# Patient Record
Sex: FEMALE | URBAN - METROPOLITAN AREA
[De-identification: names, ages, dates, MRNs, and addresses within clinical notes are randomized per-mention and may not be internally consistent; named-entity substitution may affect disease eponyms.]

---

## 2024-04-30 ENCOUNTER — LAB (OUTPATIENT)
Dept: URBAN - METROPOLITAN AREA CLINIC 82 | Facility: CLINIC | Age: 39
End: 2024-04-30

## 2024-04-30 ENCOUNTER — OV NP (OUTPATIENT)
Dept: URBAN - METROPOLITAN AREA CLINIC 82 | Facility: CLINIC | Age: 39
End: 2024-04-30

## 2024-04-30 VITALS
HEIGHT: 60 IN | WEIGHT: 137 LBS | HEART RATE: 98 BPM | SYSTOLIC BLOOD PRESSURE: 118 MMHG | DIASTOLIC BLOOD PRESSURE: 82 MMHG | TEMPERATURE: 97.9 F | BODY MASS INDEX: 26.9 KG/M2

## 2024-04-30 PROBLEM — 61977001: Status: ACTIVE | Noted: 2024-04-30

## 2024-04-30 NOTE — HPI-TODAY'S VISIT:
38-year-old female patient came into the office for evaluation of hep B and clearance from GI prior to starting on IVF.  Patient stated she was diagnosed with a hep B and carrier state since she was a teenager.  During adult years she had been followed closely by her PCP never had been started on treatment.  Patient denies any family history of hepatocellular cancer.  She is planning on getting IVF and her gynecologist wanted to have a clearance from the GI.  Denies any abdominal pain nausea vomiting no recent ultrasound done.  Most recent PCR quant showed 1050 viral load.  LFTs are not available to review.  Denies any abdominal pain nausea vomiting denies any liver manifestations of hep B.

## 2024-05-02 ENCOUNTER — OFFICE VISIT (OUTPATIENT)
Dept: URBAN - METROPOLITAN AREA CLINIC 114 | Facility: CLINIC | Age: 39
End: 2024-05-02
Payer: COMMERCIAL

## 2024-05-02 DIAGNOSIS — B19.10 HEPATITIS B: ICD-10-CM

## 2024-05-02 PROCEDURE — 76705 ECHO EXAM OF ABDOMEN: CPT | Performed by: INTERNAL MEDICINE

## 2024-05-03 ENCOUNTER — WEB ENCOUNTER (OUTPATIENT)
Dept: URBAN - METROPOLITAN AREA CLINIC 82 | Facility: CLINIC | Age: 39
End: 2024-05-03

## 2024-05-08 ENCOUNTER — WEB ENCOUNTER (OUTPATIENT)
Dept: URBAN - METROPOLITAN AREA CLINIC 82 | Facility: CLINIC | Age: 39
End: 2024-05-08

## 2024-05-09 ENCOUNTER — WEB ENCOUNTER (OUTPATIENT)
Dept: URBAN - METROPOLITAN AREA CLINIC 82 | Facility: CLINIC | Age: 39
End: 2024-05-09

## 2024-05-10 ENCOUNTER — WEB ENCOUNTER (OUTPATIENT)
Dept: URBAN - METROPOLITAN AREA CLINIC 82 | Facility: CLINIC | Age: 39
End: 2024-05-10

## 2024-05-10 ENCOUNTER — WEB ENCOUNTER (OUTPATIENT)
Dept: URBAN - METROPOLITAN AREA CLINIC 115 | Facility: CLINIC | Age: 39
End: 2024-05-10

## 2025-01-17 ENCOUNTER — OFFICE VISIT (OUTPATIENT)
Dept: URBAN - METROPOLITAN AREA CLINIC 82 | Facility: CLINIC | Age: 40
End: 2025-01-17

## 2025-01-22 ENCOUNTER — WEB ENCOUNTER (OUTPATIENT)
Dept: URBAN - METROPOLITAN AREA CLINIC 115 | Facility: CLINIC | Age: 40
End: 2025-01-22

## 2025-01-22 ENCOUNTER — TELEPHONE ENCOUNTER (OUTPATIENT)
Dept: URBAN - METROPOLITAN AREA CLINIC 6 | Facility: CLINIC | Age: 40
End: 2025-01-22

## 2025-01-22 ENCOUNTER — DASHBOARD ENCOUNTERS (OUTPATIENT)
Age: 40
End: 2025-01-22

## 2025-01-22 ENCOUNTER — OFFICE VISIT (OUTPATIENT)
Dept: URBAN - METROPOLITAN AREA TELEHEALTH 2 | Facility: TELEHEALTH | Age: 40
End: 2025-01-22
Payer: COMMERCIAL

## 2025-01-22 VITALS — BODY MASS INDEX: 29.06 KG/M2 | WEIGHT: 148 LBS | HEIGHT: 60 IN

## 2025-01-22 DIAGNOSIS — Z3A.20 20 WEEKS GESTATION OF PREGNANCY: ICD-10-CM

## 2025-01-22 DIAGNOSIS — B18.1 CHRONIC HEPATITIS B: ICD-10-CM

## 2025-01-22 PROBLEM — 23464008: Status: ACTIVE | Noted: 2025-01-22

## 2025-01-22 PROCEDURE — 99214 OFFICE O/P EST MOD 30 MIN: CPT | Performed by: INTERNAL MEDICINE

## 2025-01-22 NOTE — HPI-TODAY'S VISIT:
38-year-old female patient came into the office for evaluation of hep B and clearance from GI prior to starting on IVF.  Patient stated she was diagnosed with a hep B and carrier state since she was a teenager.  During adult years she had been followed closely by her PCP never had been started on treatment.  Patient denies any family history of hepatocellular cancer.  She is planning on getting IVF and her gynecologist wanted to have a clearance from the GI.  Denies any abdominal pain nausea vomiting no recent ultrasound done.  Most recent PCR quant showed 1050 viral load.  LFTs are not available to review.  Denies any abdominal pain nausea vomiting denies any liver manifestations of hep B.  1/22/25 : Patient was seen today for follow-up regarding her chronic hep B carrier states she was diagnosed with pregnancy and she was going through in vitro fertilization.  Patient is currently 20 weeks pregnant her OB have asked her to see GI since she is into second trimester and third trimester.  Patient denies any abdominal pain nausea vomiting.  Prior records were reviewed denies any family history of liver cancer.  She has chronic hep B carrier state and this is her first pregnancy.

## 2025-01-26 LAB
A/G RATIO: 1.2
ALBUMIN: 3.4
ALKALINE PHOSPHATASE: 58
ALT (SGPT): 20
AST (SGOT): 17
BILIRUBIN, TOTAL: 0.2
BUN/CREATININE RATIO: 13
BUN: 6
CALCIUM: 8.5
CARBON DIOXIDE, TOTAL: 25
CHLORIDE: 105
CREATININE: 0.45
EGFR: 125
GLOBULIN, TOTAL: 2.9
GLUCOSE: 97
HEP BE AB: REACTIVE
HEP BE AG: (no result)
HEPATITIS B SURFACE ANTIBODY QL: (no result)
HEPATITIS B VIRUS DNA: NOT DETECTED
HEPATITIS B VIRUS DNA: NOT DETECTED
POTASSIUM: 4.3
PROTEIN, TOTAL: 6.3
SODIUM: 137

## 2025-01-31 ENCOUNTER — TELEPHONE ENCOUNTER (OUTPATIENT)
Dept: URBAN - METROPOLITAN AREA CLINIC 85 | Facility: CLINIC | Age: 40
End: 2025-01-31

## 2025-03-26 ENCOUNTER — OFFICE VISIT (OUTPATIENT)
Dept: URBAN - METROPOLITAN AREA CLINIC 115 | Facility: CLINIC | Age: 40
End: 2025-03-26
Payer: COMMERCIAL

## 2025-03-26 DIAGNOSIS — B18.1 CHRONIC HEPATITIS B: ICD-10-CM

## 2025-03-26 DIAGNOSIS — Z3A.34 34 WEEKS GESTATION OF PREGNANCY: ICD-10-CM

## 2025-03-26 PROBLEM — 13763000: Status: ACTIVE | Noted: 2025-03-26

## 2025-03-26 PROBLEM — 90797000: Status: ACTIVE | Noted: 2025-03-26

## 2025-03-26 PROCEDURE — 99214 OFFICE O/P EST MOD 30 MIN: CPT | Performed by: INTERNAL MEDICINE

## 2025-03-26 NOTE — HPI-TODAY'S VISIT:
38-year-old female patient came into the office for evaluation of hep B and clearance from GI prior to starting on IVF.  Patient stated she was diagnosed with a hep B and carrier state since she was a teenager.  During adult years she had been followed closely by her PCP never had been started on treatment.  Patient denies any family history of hepatocellular cancer.  She is planning on getting IVF and her gynecologist wanted to have a clearance from the GI.  Denies any abdominal pain nausea vomiting no recent ultrasound done.  Most recent PCR quant showed 1050 viral load.  LFTs are not available to review.  Denies any abdominal pain nausea vomiting denies any liver manifestations of hep B.  1/22/25 : Patient was seen today for follow-up regarding her chronic hep B carrier states she was diagnosed with pregnancy and she was going through in vitro fertilization.  Patient is currently 20 weeks pregnant her OB have asked her to see GI since she is into second trimester and third trimester.  Patient denies any abdominal pain nausea vomiting.  Prior records were reviewed denies any family history of liver cancer.  She has chronic hep B carrier state and this is her first pregnancy.  3/26/25 :  no new complaints.  Reports stable BP, no concerns for preeclampisa. currently 34 weeks pregnent . Last labs reviewed.  She denies any abdominal pain, nausea or vomiting.

## 2025-03-29 LAB
A/G RATIO: 1.3
ALBUMIN: 3.5
ALKALINE PHOSPHATASE: 142
ALT (SGPT): 24
AST (SGOT): 21
BILIRUBIN, TOTAL: 0.3
BUN/CREATININE RATIO: 22
BUN: 8
CALCIUM: 8.8
CARBON DIOXIDE, TOTAL: 22
CHLORIDE: 105
CREATININE: 0.37
EGFR: 131
GLOBULIN, TOTAL: 2.8
GLUCOSE: 70
HEPATITIS B VIRUS DNA: (no result)
HEPATITIS B VIRUS DNA: (no result)
POTASSIUM: 4.2
PROTEIN, TOTAL: 6.3
SODIUM: 138

## 2025-04-21 LAB
A/G RATIO: 1.1
ALBUMIN: 3.3
ALKALINE PHOSPHATASE: 185
ALT (SGPT): 23
AST (SGOT): 21
BILIRUBIN, TOTAL: 0.4
BUN/CREATININE RATIO: 23
BUN: 9
CALCIUM: 8.8
CARBON DIOXIDE, TOTAL: 22
CHLORIDE: 106
CREATININE: 0.39
EGFR: 130
GLOBULIN, TOTAL: 2.9
GLUCOSE: 63
HEPATITIS B VIRUS DNA: NOT DETECTED
HEPATITIS B VIRUS DNA: NOT DETECTED
POTASSIUM: 4.2
PROTEIN, TOTAL: 6.2
SODIUM: 137

## 2025-04-23 ENCOUNTER — OFFICE VISIT (OUTPATIENT)
Dept: URBAN - METROPOLITAN AREA CLINIC 115 | Facility: CLINIC | Age: 40
End: 2025-04-23
Payer: COMMERCIAL

## 2025-04-23 ENCOUNTER — LAB OUTSIDE AN ENCOUNTER (OUTPATIENT)
Dept: URBAN - METROPOLITAN AREA CLINIC 115 | Facility: CLINIC | Age: 40
End: 2025-04-23

## 2025-04-23 DIAGNOSIS — B18.1 CHRONIC HEPATITIS B: ICD-10-CM

## 2025-04-23 DIAGNOSIS — Z3A.38 38 WEEKS GESTATION OF PREGNANCY: ICD-10-CM

## 2025-04-23 PROBLEM — 13798002: Status: ACTIVE | Noted: 2025-04-23

## 2025-04-23 PROCEDURE — 99214 OFFICE O/P EST MOD 30 MIN: CPT | Performed by: INTERNAL MEDICINE

## 2025-04-23 NOTE — HPI-TODAY'S VISIT:
38-year-old female patient came into the office for evaluation of hep B and clearance from GI prior to starting on IVF.  Patient stated she was diagnosed with a hep B and carrier state since she was a teenager.  During adult years she had been followed closely by her PCP never had been started on treatment.  Patient denies any family history of hepatocellular cancer.  She is planning on getting IVF and her gynecologist wanted to have a clearance from the GI.  Denies any abdominal pain nausea vomiting no recent ultrasound done.  Most recent PCR quant showed 1050 viral load.  LFTs are not available to review.  Denies any abdominal pain nausea vomiting denies any liver manifestations of hep B.  1/22/25 : Patient was seen today for follow-up regarding her chronic hep B carrier states she was diagnosed with pregnancy and she was going through in vitro fertilization.  Patient is currently 20 weeks pregnant her OB have asked her to see GI since she is into second trimester and third trimester.  Patient denies any abdominal pain nausea vomiting.  Prior records were reviewed denies any family history of liver cancer.  She has chronic hep B carrier state and this is her first pregnancy.  3/26/25 :  no new complaints.  Reports stable BP, no concerns for preeclampisa. currently 34 weeks pregnent . Last labs reviewed.  She denies any abdominal pain, nausea or vomiting.  4/23/25 : pt is doing well . labs this week again, no evidence of Hep B DNA quant. pt has Hep B ag positive with Hep B eAb positive and Hep B e Ag neg which is a Hep B carrier state .  elevated ALkphos is due to pregnency.  denies any abdominal pain or any evidene of pre eclampsia.